# Patient Record
(demographics unavailable — no encounter records)

---

## 2025-01-09 NOTE — HISTORY OF PRESENT ILLNESS
[FreeTextEntry1] : Follow-up for elevated PSA, PSA normalized to 0.81 in Jan 2024 History of ureteral injury, then had left ureteral reimplant. Most recent BMP is normal. Patient does not has PCP, wants to do blood panel here  Patient will do colonoscopy this year and scheduled see cardiologist in 3 months.  Plan: PSA.  Follow-up 1 year.  Wellness labs.  Please refer to URO Consult Note.

## 2025-01-09 NOTE — LETTER BODY
[FreeTextEntry1] : The patient does not have a PCP.  Reason for Visit: Elevated PSA. Previous left ureteral injury  This is a 67 year-old gentleman with previous left ureteral injury status post previous ureteroureterostomy and exploratory laparotomy. He has a strong family history of prostate cancer. The patient returns today for follow up. Since he was last seen, the patient is overall well. His last PSA was 0.81. His most recent BMP is normal. The patient denies any gross hematuria or dysuria or urinary incontinence. He offers no urinary complaints. Patient was involved. Wound approximately 20 years ago. He required left ureteral reimplant. 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 Patient reports that he will do colonoscopy this year and is scheduled to see cardiologist in 3 months. The patient is entirely asymptomatic. All other review of systems are negative. Past medical history, family history and social history were inquired and were noncontributory to current condition. Medications and allergies were reviewed. He has no known allergies to medication.    On examination, the patient is a healthy-appearing gentleman in no acute distress. He is alert and oriented follows commands. He has normal mood and affect. He is normocephalic. Oral no thrush. Neck is supple. Respirations are unlabored. His abdomen is soft and nontender. Liver is nonpalpable. Bladder is nonpalpable. No CVA tenderness. Neurologically he is grossly intact. No peripheral edema. Skin without gross abnormality.    His PSA is 0.81, which is within normal limits. His BMP demonstrated normal renal functions, creatinine 1.11.    Assessment: Previous left ureteral injury. Elevated PSA.    I counseled the patient. He is doing well. He has no urinary symptoms. I recommended the patient repeat PSA and BMP today to ensure stability. The patient understands that if he develops gross hematuria or any urinary discomfort, he will contact me for further evaluation. I recommended he obtains wellness panel.  Patient plans to see cardiology and continue follow-up with gastroenterology as well.  Risks and alternatives were discussed. Patient will continue longitudinal care for his complex and serious chronic condition. I answered the patient questions. The patient will follow-up in 1 year and will contact me with any questions or concerns.    Plan: PSA. BMP. Wellness panel. Follow-up in 1 year.  I spent 20-minutes time today on all issues related to this patient on today date of service including non face to face time.

## 2025-01-09 NOTE — ADDENDUM
[FreeTextEntry1] : Entered by Dmitriy Mendez, acting as scribe for Dr. Reji Nichols. The documentation recorded by the scribe accurately reflects the service I personally performed and the decisions made by me.